# Patient Record
Sex: MALE | Race: WHITE | NOT HISPANIC OR LATINO
[De-identification: names, ages, dates, MRNs, and addresses within clinical notes are randomized per-mention and may not be internally consistent; named-entity substitution may affect disease eponyms.]

---

## 2023-05-24 ENCOUNTER — NON-APPOINTMENT (OUTPATIENT)
Age: 28
End: 2023-05-24

## 2023-07-01 ENCOUNTER — NON-APPOINTMENT (OUTPATIENT)
Age: 28
End: 2023-07-01

## 2023-10-11 ENCOUNTER — NON-APPOINTMENT (OUTPATIENT)
Age: 28
End: 2023-10-11

## 2023-11-06 ENCOUNTER — NON-APPOINTMENT (OUTPATIENT)
Age: 28
End: 2023-11-06

## 2023-12-01 ENCOUNTER — EMERGENCY (EMERGENCY)
Facility: HOSPITAL | Age: 28
LOS: 1 days | Discharge: HOME | End: 2023-12-01
Attending: STUDENT IN AN ORGANIZED HEALTH CARE EDUCATION/TRAINING PROGRAM | Admitting: STUDENT IN AN ORGANIZED HEALTH CARE EDUCATION/TRAINING PROGRAM
Payer: MEDICAID

## 2023-12-01 VITALS
SYSTOLIC BLOOD PRESSURE: 135 MMHG | HEART RATE: 79 BPM | DIASTOLIC BLOOD PRESSURE: 75 MMHG | TEMPERATURE: 98 F | RESPIRATION RATE: 17 BRPM | OXYGEN SATURATION: 98 %

## 2023-12-01 VITALS
SYSTOLIC BLOOD PRESSURE: 148 MMHG | RESPIRATION RATE: 18 BRPM | TEMPERATURE: 98 F | HEART RATE: 80 BPM | OXYGEN SATURATION: 98 % | HEIGHT: 69 IN | DIASTOLIC BLOOD PRESSURE: 97 MMHG | WEIGHT: 285.06 LBS

## 2023-12-01 DIAGNOSIS — Z98.89 OTHER SPECIFIED POSTPROCEDURAL STATES: Chronic | ICD-10-CM

## 2023-12-01 PROCEDURE — 99284 EMERGENCY DEPT VISIT MOD MDM: CPT

## 2023-12-01 PROCEDURE — 93970 EXTREMITY STUDY: CPT

## 2023-12-01 PROCEDURE — 93970 EXTREMITY STUDY: CPT | Mod: 26

## 2023-12-01 RX ORDER — CEPHALEXIN 500 MG
1 CAPSULE ORAL
Qty: 20 | Refills: 0
Start: 2023-12-01 | End: 2023-12-05

## 2023-12-01 RX ORDER — CEPHALEXIN 500 MG
500 CAPSULE ORAL ONCE
Refills: 0 | Status: COMPLETED | OUTPATIENT
Start: 2023-12-01 | End: 2023-12-01

## 2023-12-01 RX ADMIN — Medication 500 MILLIGRAM(S): at 16:48

## 2023-12-01 NOTE — ED PROVIDER NOTE - OBJECTIVE STATEMENT
27 y/o M PMH of Factor V Leiden deficiency (heterozygous) presenting with LE edema    Has noted LE edema for over one month and obtain US Duplex on 11/7 that was negative. In the last two days he has noted redness b/l medial LE. Denies any fevers/chills or purulent drainage. Has not been using compression stockings or elevating legs. No chest pain or dyspnea.

## 2023-12-01 NOTE — ED ADULT TRIAGE NOTE - CHIEF COMPLAINT QUOTE
Pt reports bilateral lower leg swelling, numbness and tingling x 4 weeks. Rash on lower legs started within the last 2 days. Pt states it is not itchy.

## 2023-12-01 NOTE — ED PROVIDER NOTE - CLINICAL SUMMARY MEDICAL DECISION MAKING FREE TEXT BOX
27 y/o M PMH of Factor V Leiden deficiency (heterozygous) presenting with LE edema & associated erythema.   Likely venous stasis   US duplex negative for DVT  Will prescribe short course of cephalexin in the case of developing cellulitis.   Encouraged use of compression stockings and leg elevation.

## 2023-12-01 NOTE — ED ADULT NURSE NOTE - OBJECTIVE STATEMENT
AOx44, RR even and unlabored, not in distress. Reports of lower leg swelling for a month, +2 pitting edema bilateral. He states that he went to the urgent care two weeks ago had a blood tests and ultrasound, claims that they couldn't find anything. Noother complaints

## 2023-12-01 NOTE — ED PROVIDER NOTE - NSFOLLOWUPINSTRUCTIONS_ED_ALL_ED_FT
1. Take Cephalexin four times daily for the next five days.  2. Elevated legs and use compression stockings   3. Follow up with your primary care doctor next week

## 2023-12-01 NOTE — ED PROVIDER NOTE - PHYSICAL EXAMINATION
GENERAL: no acute distress; well-developed  HEAD:  Atraumatic, Normocephalic  EYES: EOMI, PERRLA, conjunctiva and sclera clear  ENT: MMM; oropharynx clear  NECK: Supple, No JVD  CHEST/LUNG: Clear to auscultation bilaterally; No wheeze  HEART: Regular rate and rhythm; No murmurs, rubs, or gallops  ABDOMEN: Soft, Nontender, Nondistended; Bowel sounds present  EXTREMITIES:  2+ Peripheral Pulses, b/l LE edema. Erythema noted to medial b/l LE- warmth R >L   PSYCH: AAOx3  NEUROLOGY: no focal motor or sensory deficits. 5/5 muscle strength in all extremities.   SKIN: No rashes or lesions

## 2024-08-25 ENCOUNTER — NON-APPOINTMENT (OUTPATIENT)
Age: 29
End: 2024-08-25

## 2024-10-26 ENCOUNTER — NON-APPOINTMENT (OUTPATIENT)
Age: 29
End: 2024-10-26